# Patient Record
Sex: MALE | Race: OTHER | Employment: OTHER | URBAN - METROPOLITAN AREA
[De-identification: names, ages, dates, MRNs, and addresses within clinical notes are randomized per-mention and may not be internally consistent; named-entity substitution may affect disease eponyms.]

---

## 2017-08-01 ENCOUNTER — OFFICE VISIT (OUTPATIENT)
Dept: HEMATOLOGY | Age: 47
End: 2017-08-01

## 2017-08-01 ENCOUNTER — HOSPITAL ENCOUNTER (OUTPATIENT)
Dept: LAB | Age: 47
Discharge: HOME | End: 2017-08-01

## 2017-08-01 ENCOUNTER — HOSPITAL ENCOUNTER (OUTPATIENT)
Dept: LAB | Age: 47
Discharge: HOME | End: 2017-08-01
Payer: OTHER GOVERNMENT

## 2017-08-01 VITALS
HEIGHT: 69 IN | TEMPERATURE: 99.5 F | HEART RATE: 77 BPM | WEIGHT: 268.4 LBS | SYSTOLIC BLOOD PRESSURE: 135 MMHG | RESPIRATION RATE: 18 BRPM | DIASTOLIC BLOOD PRESSURE: 88 MMHG | OXYGEN SATURATION: 98 % | BODY MASS INDEX: 39.75 KG/M2

## 2017-08-01 DIAGNOSIS — K76.0 FATTY LIVER: ICD-10-CM

## 2017-08-01 DIAGNOSIS — K76.0 FATTY LIVER: Primary | ICD-10-CM

## 2017-08-01 PROBLEM — I10 HYPERTENSION: Status: ACTIVE | Noted: 2017-08-01

## 2017-08-01 PROBLEM — M54.9 BACK PAIN: Status: ACTIVE | Noted: 2017-08-01

## 2017-08-01 PROBLEM — R74.8 ELEVATED LIVER ENZYMES: Status: ACTIVE | Noted: 2017-08-01

## 2017-08-01 PROBLEM — Z90.49 S/P CHOLECYSTECTOMY: Status: ACTIVE | Noted: 2017-08-01

## 2017-08-01 PROCEDURE — 86708 HEPATITIS A ANTIBODY: CPT | Performed by: INTERNAL MEDICINE

## 2017-08-01 PROCEDURE — 85025 COMPLETE CBC W/AUTO DIFF WBC: CPT | Performed by: INTERNAL MEDICINE

## 2017-08-01 PROCEDURE — 36415 COLL VENOUS BLD VENIPUNCTURE: CPT | Performed by: INTERNAL MEDICINE

## 2017-08-01 PROCEDURE — 82728 ASSAY OF FERRITIN: CPT | Performed by: INTERNAL MEDICINE

## 2017-08-01 PROCEDURE — 86704 HEP B CORE ANTIBODY TOTAL: CPT | Performed by: INTERNAL MEDICINE

## 2017-08-01 PROCEDURE — 87340 HEPATITIS B SURFACE AG IA: CPT | Performed by: INTERNAL MEDICINE

## 2017-08-01 PROCEDURE — 80048 BASIC METABOLIC PNL TOTAL CA: CPT | Performed by: INTERNAL MEDICINE

## 2017-08-01 PROCEDURE — 86803 HEPATITIS C AB TEST: CPT | Performed by: INTERNAL MEDICINE

## 2017-08-01 PROCEDURE — 83540 ASSAY OF IRON: CPT | Performed by: INTERNAL MEDICINE

## 2017-08-01 PROCEDURE — 85610 PROTHROMBIN TIME: CPT | Performed by: INTERNAL MEDICINE

## 2017-08-01 PROCEDURE — 86706 HEP B SURFACE ANTIBODY: CPT | Performed by: INTERNAL MEDICINE

## 2017-08-01 PROCEDURE — 83036 HEMOGLOBIN GLYCOSYLATED A1C: CPT | Performed by: INTERNAL MEDICINE

## 2017-08-01 PROCEDURE — 80076 HEPATIC FUNCTION PANEL: CPT | Performed by: INTERNAL MEDICINE

## 2017-08-01 PROCEDURE — 80061 LIPID PANEL: CPT | Performed by: INTERNAL MEDICINE

## 2017-08-01 RX ORDER — PANTOPRAZOLE SODIUM 20 MG/1
20 TABLET, DELAYED RELEASE ORAL DAILY
COMMUNITY

## 2017-08-01 RX ORDER — IBUPROFEN 800 MG/1
800 TABLET ORAL
COMMUNITY

## 2017-08-01 RX ORDER — ALLOPURINOL 300 MG/1
TABLET ORAL DAILY
COMMUNITY

## 2017-08-01 RX ORDER — CYCLOBENZAPRINE HCL 10 MG
TABLET ORAL
COMMUNITY

## 2017-08-01 RX ORDER — LISINOPRIL AND HYDROCHLOROTHIAZIDE 20; 25 MG/1; MG/1
TABLET ORAL DAILY
COMMUNITY

## 2017-08-01 RX ORDER — SILDENAFIL 100 MG/1
100 TABLET, FILM COATED ORAL AS NEEDED
COMMUNITY

## 2017-08-01 NOTE — PROGRESS NOTES
[address] [place], [address], [place], [place]       [place] L[place] PA-C Vesta Loll, NP Charmaine Postin, NP        at [address]     [address] [place] [address] Ave, [address] [address]     [phone]     FAX: [phone]    at [address], [address] [facility], [address]     [phone]     FAX: [phone]         No care team member to display  [facility] clinic       Problem List  Date Reviewed: 8/1/2017          Codes Class Noted    Elevated liver enzymes ICD-10-CM: R74.8  ICD-9-CM: 790.5  8/1/2017        Fatty liver ICD-10-CM: K76.0  ICD-9-CM: 571.8  8/1/2017        Hypertension ICD-10-CM: I10  ICD-9-CM: 401.9  8/1/2017        Back pain ICD-10-CM: M54.9  ICD-9-CM: 724.5  8/1/2017        S/P cholecystectomy ICD-10-CM: Z90.49  ICD-9-CM: V45.79  8/1/2017                The physicians listed above have asked me to see Veronica Doherty in consultation regarding suspected fatty liver disease and its management. All medical records sent by the referring physicians were reviewed including imaging studies     The patient is a 51 y.o.  male who is suspected to have fatty liver disease based upon ultrasound. Serologic evaluation was either not perfomred or available to me. Ultrasound of the liver was performed in 5/2017. The results of the imaging suggested fatty liver disease. An assessment of liver fibrosis with biopsy or elastography has not been performed. The most recent laboratory studies indicate that the liver transaminases are elevated, ALP is normal, tests of hepatic synthetic and metabolic function are normal, and the platelet count is normal.    The patient has no symptoms which could be attributed to the liver disorder. The patient completes all daily activities without any functional limitations.       The patient has not experienced fatigue, pain in the right side over the liver,       ALLERGIES  No Known Allergies    MEDICATIONS  Current Outpatient Prescriptions   Medication Sig    ibuprofen (MOTRIN) 800 mg tablet Take 800 mg by mouth.  cyclobenzaprine (FLEXERIL) 10 mg tablet Take  by mouth three (3) times daily as needed for Muscle Spasm(s).  sildenafil citrate (VIAGRA) 100 mg tablet Take 100 mg by mouth as needed.  allopurinol (ZYLOPRIM) 300 mg tablet Take  by mouth daily.  lisinopril-hydroCHLOROthiazide (PRINZIDE, ZESTORETIC) 20-25 mg per tablet Take  by mouth daily.  pantoprazole (PROTONIX) 20 mg tablet Take 20 mg by mouth daily. No current facility-administered medications for this visit. SYSTEM REVIEW NOT RELATED TO LIVER DISEASE OR REVIEWED ABOVE:  Constitution systems: Negative for fever, chills, weight gain, weight loss. Eyes: Negative for visual changes. ENT: Negative for sore throat, painful swallowing. Respiratory: Negative for cough, hemoptysis, SOB. Cardiology: Negative for chest pain, palpitations. GI:  Negative for constipation or diarrhea. : Negative for urinary frequency, dysuria, hematuria, nocturia. Skin: Negative for rash. Hematology: Negative for easy bruising, blood clots. Musculo-skelatal: Negative for back pain, muscle pain, weakness. Neurologic: Negative for headaches, dizziness, vertigo, memory problems not related to HE. Psychology: Negative for anxiety, depression. FAMILY HISTORY:  The father has the following chronic diseases: ESRD, DM. The mother has the following chronic diseases: HTN. There is no family history of liver disease. SOCIAL HISTORY:  The patient is . The patient has 3 children,   The patient has never used tobacco products. The patient has never consumed significant amounts of alcohol. 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         PHYSICAL EXAMINATION:  Visit Vitals    /88 (BP 1 Location: Right arm, BP Patient Position: Sitting)    Pulse 77    Temp 99.5 °F (37.5 °C) (Tympanic)    Resp 18    Ht 5' 9\" (1.753 m)    Wt 268 lb 6.4 oz (121.7 kg)    SpO2 98%    BMI 39.64 kg/m2     General: No acute distress. Eyes: Sclera anicteric. ENT: No oral lesions. Thyroid normal.  Nodes: No adenopathy. Skin: No spider angiomata. No jaundice. No palmar erythema. Respiratory: Lungs clear to auscultation. Cardiovascular: Regular heart rate. No murmurs. No JVD. Abdomen: Soft non-tender. Liver size normal to percussion/palpation. Spleen not palpable. No obvious ascites. Extremities: No edema. No muscle wasting. No gross arthritic changes. Neurologic: Alert and oriented. Cranial nerves grossly intact. No asterixis. LABORATORY STUDIES:  [facility] [address] & Units 8/1/2017   WBC 4.6 - 13.2 K/uL 6.0   ANC 1.8 - 8.0 K/UL 2.8   HGB 13.0 - 16.0 g/dL 15.5    - 420 K/uL 263   INR 0.8 - 1.2   0.9   AST 15 - 37 U/L 60 (H)   ALT 16 - 61 U/L 207 (H)   Alk Phos 45 - 117 U/L 103   Bili, Total 0.2 - 1.0 MG/DL 0.6   Bili, Direct 0.0 - 0.2 MG/DL 0.1   Albumin 3.4 - 5.0 g/dL 4.6   BUN 7.0 - 18 MG/DL 17   Creat 0.6 - 1.3 MG/DL 1.02   Na 136 - 145 mmol/L 142   K 3.5 - 5.5 mmol/L 4.2   Cl 100 - 108 mmol/L 100   CO2 21 - 32 mmol/L 30   Glucose 74 - 99 mg/dL 99     SEROLOGIES:  Serologies Latest Ref Rng & Units 8/1/2017   Hep A Ab, Total NEGATIVE   Positive (A)   Hep B Surface Ag <1.00 Index <0.10   Hep B Surface Ag Interp NEG   NEGATIVE   Hep B Core Ab, Total NEGATIVE   NEGATIVE   Hep B Surface Ab >10.0 mIU/mL 9.59 (L)   Hep B Surface Ab Interp POS   NEGATIVE (A)   Hep C Ab 0.0 - 0.9 s/co ratio <0.1   Ferritin 8 - 388 NG/ (H)   Iron % Saturation % 31     LIVER HISTOLOGY:  Not available or performed    ENDOSCOPIC PROCEDURES:  Not available or performed    RADIOLOGY:  5/2017. CT scan abdomen with IV contrast.  Changes consistent with fatty liver. No liver mass lesions.  Normal spleen. No ascites. 6/2017. Ultrasound of liver. Echogenic consistent with fatty liver. No liver mass lesions. No dilated bile ducts. No ascites. OTHER TESTING:  Not available or performed    ASSESSMENT AND PLAN:  Suspected fatty liver disease of unclear histologic severity. Liver transaminases are elevated. Alkaline phosphate is normal.  Liver function is normal.  The platelet count is normal.      Based upon laboratory studies and imaging  the patient does not appear to have advanced liver disease. Will perform laboratory testing to monitor liver function and degree of liver injury. This included BMP, hepatic panel, CBC with platelet count, INR. Will perform serologic and virologic studies to assess for other causes of chronic liver disease. Suspect the patient has fatty liver based upon ultrasound, an elevation in serum liver transaminases, and serology that is negative for all other causes of chronic liver disease. Only a liver biopsy can confirm is the patient does have fatty liver and differentiate benign steatosis from ARANA. The treatment is the same; weight reduction. If the liver biopsy demonstrates ARANA the patient would be eligible for enrollment into clinical trials for treatment of ARANA. The need to perform liver biopsy to assess disease severity was discussed with the patient. The risks of performing the liver biopsy including pain, puncture of the lung, gallbladder, intestine or kidney and bleeding were discussed. The patient has decided to have a liver biopsy. This will be scheduled. Will defer liver biopsy for now. The patient was counseled regarding diet and exercise to achieve weight loss. The best diet for patients with fatty liver is one very low in carbohydrates and enriched with protein such as an Atkins program.      The patient was directed to continue all current medications at the current dosages.   There are no contraindications for the patient to take any medications that are necessary for treatment of other medical issues including medications for diabetes mellitus and hypercholesterolemia. The patient was counseled regarding alcohol consumption and that this could contribute to fatty liver disease. Vaccination for viral hepatitis A is not required. The patient has serologic evidence of prior exposure or vaccination with immunity. Vaccination for viral hepatitis B has been completed. Serologic studies indicate he needs a booster. All of the above issues were discussed with the patient. All questions were answered. The patient expressed a clear understanding of the above. [address] [address] in 2 weeks after liver biopsy.     Bhupinder Frias MD  [facility] of [address] [place] [address], [address] [facility], [address] [place] [address] Street [address]  [phone]

## 2017-08-01 NOTE — PROGRESS NOTES
Jules Cole is a 51 y.o. male    No chief complaint on file. 1. Have you been to the ER, urgent care clinic or hospitalized since your last visit? NO.     2. Have you seen or consulted any other health care providers outside of the [address] [place] [address] since your last visit (Include any pap smears or colon screening)?  NO

## 2017-08-02 LAB
ALBUMIN SERPL BCP-MCNC: 4.6 G/DL (ref 3.4–5)
ALBUMIN/GLOB SERPL: 1.4 {RATIO} (ref 0.8–1.7)
ALP SERPL-CCNC: 103 U/L (ref 45–117)
ALT SERPL-CCNC: 207 U/L (ref 16–61)
ANION GAP BLD CALC-SCNC: 12 MMOL/L (ref 3–18)
AST SERPL W P-5'-P-CCNC: 60 U/L (ref 15–37)
BASOPHILS # BLD AUTO: 0.1 K/UL (ref 0–0.06)
BASOPHILS # BLD: 1 % (ref 0–2)
BILIRUB DIRECT SERPL-MCNC: 0.1 MG/DL (ref 0–0.2)
BILIRUB SERPL-MCNC: 0.6 MG/DL (ref 0.2–1)
BUN SERPL-MCNC: 17 MG/DL (ref 7–18)
BUN/CREAT SERPL: 17 (ref 12–20)
CALCIUM SERPL-MCNC: 9.7 MG/DL (ref 8.5–10.1)
CHLORIDE SERPL-SCNC: 100 MMOL/L (ref 100–108)
CHOLEST SERPL-MCNC: 276 MG/DL
CO2 SERPL-SCNC: 30 MMOL/L (ref 21–32)
CREAT SERPL-MCNC: 1.02 MG/DL (ref 0.6–1.3)
DIFFERENTIAL METHOD BLD: ABNORMAL
EOSINOPHIL # BLD: 0.2 K/UL (ref 0–0.4)
EOSINOPHIL NFR BLD: 4 % (ref 0–5)
ERYTHROCYTE [DISTWIDTH] IN BLOOD BY AUTOMATED COUNT: 13.4 % (ref 11.6–14.5)
EST. AVERAGE GLUCOSE BLD GHB EST-MCNC: 111 MG/DL
FERRITIN SERPL-MCNC: 403 NG/ML (ref 8–388)
GLOBULIN SER CALC-MCNC: 3.4 G/DL (ref 2–4)
GLUCOSE SERPL-MCNC: 99 MG/DL (ref 74–99)
HBA1C MFR BLD: 5.5 % (ref 4.5–5.6)
HBV SURFACE AB SER QL IA: NEGATIVE
HBV SURFACE AB SERPL IA-ACNC: 9.59 MIU/ML
HBV SURFACE AG SER QL: <0.1 INDEX
HBV SURFACE AG SER QL: NEGATIVE
HCT VFR BLD AUTO: 45.1 % (ref 36–48)
HDLC SERPL-MCNC: 54 MG/DL (ref 40–60)
HDLC SERPL: 5.1 {RATIO} (ref 0–5)
HEP BS AB COMMENT,HBSAC: ABNORMAL
HGB BLD-MCNC: 15.5 G/DL (ref 13–16)
INR PPP: 0.9 (ref 0.8–1.2)
IRON SATN MFR SERPL: 31 %
IRON SERPL-MCNC: 122 UG/DL (ref 50–175)
LDLC SERPL CALC-MCNC: 178.2 MG/DL (ref 0–100)
LIPID PROFILE,FLP: ABNORMAL
LYMPHOCYTES # BLD AUTO: 36 % (ref 21–52)
LYMPHOCYTES # BLD: 2.2 K/UL (ref 0.9–3.6)
MCH RBC QN AUTO: 32 PG (ref 24–34)
MCHC RBC AUTO-ENTMCNC: 34.4 G/DL (ref 31–37)
MCV RBC AUTO: 93.2 FL (ref 74–97)
MONOCYTES # BLD: 0.7 K/UL (ref 0.05–1.2)
MONOCYTES NFR BLD AUTO: 12 % (ref 3–10)
NEUTS SEG # BLD: 2.8 K/UL (ref 1.8–8)
NEUTS SEG NFR BLD AUTO: 47 % (ref 40–73)
PLATELET # BLD AUTO: 263 K/UL (ref 135–420)
PMV BLD AUTO: 11.3 FL (ref 9.2–11.8)
POTASSIUM SERPL-SCNC: 4.2 MMOL/L (ref 3.5–5.5)
PROT SERPL-MCNC: 8 G/DL (ref 6.4–8.2)
PROTHROMBIN TIME: 12.1 SEC (ref 11.5–15.2)
RBC # BLD AUTO: 4.84 M/UL (ref 4.7–5.5)
SODIUM SERPL-SCNC: 142 MMOL/L (ref 136–145)
TIBC SERPL-MCNC: 388 UG/DL (ref 250–450)
TRIGL SERPL-MCNC: 219 MG/DL (ref ?–150)
VLDLC SERPL CALC-MCNC: 43.8 MG/DL
WBC # BLD AUTO: 6 K/UL (ref 4.6–13.2)

## 2017-08-03 LAB
COMMENT, 144067: NORMAL
HAV AB SER QL IA: POSITIVE
HBV CORE AB SERPL QL IA: NEGATIVE
HCV AB S/CO SERPL IA: <0.1 S/CO RATIO (ref 0–0.9)

## 2017-10-14 NOTE — MR AVS SNAPSHOT
Visit Information Date & Time Provider Department Dept. Phone Encounter #  
 8/1/2017  3:00 PM Nathanael Isbell MD [facility] [phone] [id number] Follow-up Instructions Return for 2 weeks after LBX. Upcoming Health Maintenance Date Due DTaP/Tdap/Td series (1 - Tdap) 10/2/1990 INFLUENZA AGE 9 TO ADULT 8/1/2017 Allergies as of 8/1/2017  Review Complete On: 8/1/2017 By: Nathanael Isbell MD  
 No Known Allergies Current Immunizations  Never Reviewed No immunizations on file. Not reviewed this visit You Were Diagnosed With   
  
 Codes Comments Fatty liver    -  Primary ICD-10-CM: K76.0 ICD-9-CM: 571.8 Vitals BP Pulse Temp Resp Height(growth percentile) 135/88 (BP 1 Location: Right arm, BP Patient Position: Sitting) 77 99.5 °F (37.5 °C) (Tympanic) 18 5' 9\" (1.753 m) Weight(growth percentile) SpO2 BMI Smoking Status 268 lb 6.4 oz (121.7 kg) 98% 39.64 kg/m2 Never Smoker BMI and BSA Data Body Mass Index Body Surface Area  
 39.64 kg/m 2 2.43 m 2 Your Updated Medication List  
  
   
This list is accurate as of: 8/1/17  3:59 PM.  Always use your most recent med list.  
  
  
  
  
 allopurinol 300 mg tablet Commonly known as:  Susan Aby Take  by mouth daily. cyclobenzaprine 10 mg tablet Commonly known as:  FLEXERIL Take  by mouth three (3) times daily as needed for Muscle Spasm(s). ibuprofen 800 mg tablet Commonly known as:  MOTRIN Take 800 mg by mouth.  
  
 lisinopril-hydroCHLOROthiazide 20-25 mg per tablet Commonly known as:  Marjean Herbie Take  by mouth daily. pantoprazole 20 mg tablet Commonly known as:  PROTONIX Take 20 mg by mouth daily. VIAGRA 100 mg tablet Generic drug:  sildenafil citrate Take 100 mg by mouth as needed. Follow-up Instructions Return for 2 weeks after LBX. To-Do List   
 08/01/2017 Lab:  CBC WITH AUTOMATED DIFF   
  
 08/01/2017 Lab:  FERRITIN   
  
 08/01/2017 Lab:  HCV AB W/REFLEX VERIFICATION   
  
 08/01/2017 Lab:  HEMOGLOBIN A1C WITH EAG   
  
 08/01/2017 Lab:  HEP A AB, TOTAL   
  
 08/01/2017 Lab:  HEP B SURFACE AB   
  
 08/01/2017 Lab:  HEP B SURFACE AG   
  
 08/01/2017 Lab:  HEPATIC FUNCTION PANEL   
  
 08/01/2017 Lab:  HEPATITIS B CORE AB, TOTAL   
  
 08/01/2017 Lab:  IRON PROFILE   
  
 08/01/2017 Lab:  LIPID PANEL   
  
 08/01/2017 Lab:  METABOLIC PANEL, BASIC   
  
 08/01/2017 Lab:  PROTHROMBIN TIME + INR   
  
 08/31/2017 Procedures:  BIOPSY LIVER Introducing [facility]! [facility] introduces Virtual Paper patient portal. Now you can access parts of your medical record, email your doctor's office, and request medication refills online. 1. In your internet browser, go to [email or web address] 2. Click on the First Time User? Click Here link in the Sign In box. You will see the New Member Sign Up page. 3. Enter your Virtual Paper Access Code exactly as it appears below. You will not need to use this code after youve completed the sign-up process. If you do not sign up before the expiration date, you must request a new code. · Virtual Paper Access Code: [email or web address] Expires: 10/30/2017  3:59 PM 
 
4. Enter the last four digits of your Social Security Number (xxxx) and Date of Birth (mm/dd/yyyy) as indicated and click Submit. You will be taken to the next sign-up page. 5. Create a CoAlignt ID. This will be your Virtual Paper login ID and cannot be changed, so think of one that is secure and easy to remember. 6. Create a [employer]Alignt password. You can change your password at any time. 7. Enter your Password Reset Question and Answer. This can be used at a later time if you forget your password. 8. Enter your e-mail address.  You will receive e-mail notification when new information is available in NoteSick. 9. Click Sign Up. You can now view and download portions of your medical record. 10. Click the Download Summary menu link to download a portable copy of your medical information. If you have questions, please visit the Frequently Asked Questions section of the NoteSick website. Remember, NoteSick is NOT to be used for urgent needs. For medical emergencies, dial 911. Now available from your iPhone and Android! Please provide this summary of care documentation to your next provider. If you have any questions after today's visit, please call [phone]. negative...

## 2017-11-14 ENCOUNTER — TELEPHONE (OUTPATIENT)
Dept: HEMATOLOGY | Age: 47
End: 2017-11-14

## 2017-11-14 NOTE — TELEPHONE ENCOUNTER
Called patient to get PCP updated in system. Patient didn't answer. Left voicemail stating to call back at earliest convenience to [phone] ext [phone] and ask for andrew to update PCP on file.

## 2017-11-14 NOTE — TELEPHONE ENCOUNTER
----- Message from Geoff Ramos MD sent at 8/13/2017  5:02 PM EDT -----  Geri Gallegos note to [facility].